# Patient Record
Sex: FEMALE | Race: WHITE | Employment: FULL TIME | URBAN - NONMETROPOLITAN AREA
[De-identification: names, ages, dates, MRNs, and addresses within clinical notes are randomized per-mention and may not be internally consistent; named-entity substitution may affect disease eponyms.]

---

## 2020-09-03 ENCOUNTER — TELEPHONE (OUTPATIENT)
Dept: OBGYN CLINIC | Age: 35
End: 2020-09-03

## 2020-09-03 RX ORDER — NORGESTIMATE AND ETHINYL ESTRADIOL 0.25-0.035
1 KIT ORAL DAILY
Qty: 1 PACKET | Refills: 0 | Status: SHIPPED | OUTPATIENT
Start: 2020-09-03 | End: 2020-10-15 | Stop reason: ALTCHOICE

## 2020-10-01 ENCOUNTER — TELEPHONE (OUTPATIENT)
Dept: OBGYN CLINIC | Age: 35
End: 2020-10-01

## 2020-10-01 RX ORDER — NORGESTIMATE AND ETHINYL ESTRADIOL 0.25-0.035
1 KIT ORAL DAILY
Qty: 3 PACKET | Refills: 0 | Status: SHIPPED | OUTPATIENT
Start: 2020-10-01 | End: 2021-05-10 | Stop reason: ALTCHOICE

## 2020-10-15 ENCOUNTER — OFFICE VISIT (OUTPATIENT)
Dept: OBGYN CLINIC | Age: 35
End: 2020-10-15
Payer: COMMERCIAL

## 2020-10-15 VITALS
HEIGHT: 66 IN | WEIGHT: 230.8 LBS | BODY MASS INDEX: 37.09 KG/M2 | SYSTOLIC BLOOD PRESSURE: 120 MMHG | DIASTOLIC BLOOD PRESSURE: 80 MMHG

## 2020-10-15 PROCEDURE — 99395 PREV VISIT EST AGE 18-39: CPT | Performed by: NURSE PRACTITIONER

## 2020-10-15 RX ORDER — LEVOTHYROXINE SODIUM 88 MCG
TABLET ORAL
COMMUNITY
Start: 2020-08-17 | End: 2021-05-10 | Stop reason: ALTCHOICE

## 2020-10-15 SDOH — HEALTH STABILITY: MENTAL HEALTH: HOW OFTEN DO YOU HAVE A DRINK CONTAINING ALCOHOL?: MONTHLY OR LESS

## 2020-10-15 NOTE — PROGRESS NOTES
YEARLY PHYSICAL    Date of service: 10/15/2020    Nader Lucero  Is a 30 y.o.  female    PT's PCP is: FREDRICK Morelos CNP     : 1985                                             Subjective:       Patient's last menstrual period was 10/07/2020 (exact date). Are your menses regular: yes     OB History    Para Term  AB Living   0 0 0 0 0 0   SAB TAB Ectopic Molar Multiple Live Births   0 0 0 0 0 0        Social History     Tobacco Use   Smoking Status Never Smoker   Smokeless Tobacco Never Used        Social History     Substance and Sexual Activity   Alcohol Use Yes    Frequency: Monthly or less       Family History   Problem Relation Age of Onset    Alzheimer's Disease Paternal Grandfather     Breast Cancer Paternal Grandmother     Hypertension Mother        Any family history of breast or ovarian cancer:  Yes    Any family history of blood clots: No      Allergies: No known allergies      Current Outpatient Medications:     SYNTHROID 88 MCG tablet, , Disp: , Rfl:     norgestimate-ethinyl estradiol (ORTHO-CYCLEN, 28,) 0.25-35 MG-MCG per tablet, Take 1 tablet by mouth daily, Disp: 3 packet, Rfl: 0    Triamcinolone Acetonide (NASACORT AQ NA), by Nasal route, Disp: , Rfl:     OMEPRAZOLE PO, Take by mouth, Disp: , Rfl:     Levothyroxine Sodium (SYNTHROID PO), Take by mouth, Disp: , Rfl:     Social History     Substance and Sexual Activity   Sexual Activity Yes    Partners: Male    Birth control/protection: OCP       Any bleeding or pain with intercourse: No    Last Yearly:  2019    Last pap: 2019    Last HPV: 2019    Last Mammogram: n/a    Last Dexascan n/a    Do you do self breast exams: Yes    Past Medical History:   Diagnosis Date    Asthma     Gastric reflux     Hypothyroid        Past Surgical History:   Procedure Laterality Date    BACK SURGERY         Family History   Problem Relation Age of Onset    Alzheimer's Disease Paternal Grandfather     Breast Cancer Paternal Grandmother     Hypertension Mother        Chief Complaint   Patient presents with    Gynecologic Exam     Pap not due.  Breast Problem     Lump on side of right breast.           PE:  Vital Signs  Blood pressure 120/80, height 5' 6\" (1.676 m), weight 230 lb 12.8 oz (104.7 kg), last menstrual period 10/07/2020. Estimated body mass index is 37.25 kg/m² as calculated from the following:    Height as of this encounter: 5' 6\" (1.676 m). Weight as of this encounter: 230 lb 12.8 oz (104.7 kg). HPI: Patient presents today for annual exam. Denies pelvic pain. Normal pap in 2019 per [facility] records. Reports breast lump on outer lower quadrant of right breast, noticed approximately 5 months ago. Denies nipple discharge or skin changes. Denies pain in the breast. Getting  in 1 month    Review of Systems   All other systems reviewed and are negative. Objective  Lymphatic:   no lymphadenopathy  Heent:   negative   Cor: regular rate and rhythm, no murmurs              Pul:clear to auscultation bilaterally- no wheezes, rales or rhonchi, normal air movement, no respiratory distress      GI: Abdomen soft, non-tender. BS normal. No masses,  No organomegaly           Extremities: normal strength, tone, and muscle mass, ROM of all joints is normal   Breasts: Breast:normal appearance, no masses or tenderness, No nipple retraction or dimpling, No nipple discharge or bleeding. Fibrocystic, dense tissue bilaterally     Pelvic Exam: GENITAL/URINARY:  External Genitalia:  General appearance; normal, Hair distribution; normal, Lesions absent  Urethral Meatus:  Size normal, Location normal, Lesions absent, Prolapse absent  Urethra:   Fullness absent, Masses absent  Bladder:  Fullness absent, Masses absent, Tenderness absent, Cystocele absent  Vagina:  General appearance normal, Estrogen effect normal, Discharge absent, Lesions absent,

## 2021-04-19 ENCOUNTER — HOSPITAL ENCOUNTER (OUTPATIENT)
Age: 36
Setting detail: SPECIMEN
Discharge: HOME | End: 2021-04-19
Payer: COMMERCIAL

## 2021-04-19 ENCOUNTER — TELEPHONE (OUTPATIENT)
Dept: OBGYN CLINIC | Age: 36
End: 2021-04-19

## 2021-04-19 DIAGNOSIS — N92.0 SPOTTING: Primary | ICD-10-CM

## 2021-04-19 DIAGNOSIS — N92.0 SPOTTING: ICD-10-CM

## 2021-04-19 LAB — HCG QUANTITATIVE: 891 IU/L

## 2021-04-20 DIAGNOSIS — N92.0 SPOTTING: Primary | ICD-10-CM

## 2021-04-20 NOTE — RESULT ENCOUNTER NOTE
Patient called back, we reviewed her results and recommendations. She will stop in tomorrow am to get a repeat HCG level. Patient denies any further spotting since her last visit. Patient verbalized understanding, no further questions/concerns voiced.

## 2021-04-21 ENCOUNTER — HOSPITAL ENCOUNTER (OUTPATIENT)
Age: 36
Setting detail: SPECIMEN
Discharge: HOME | End: 2021-04-21
Payer: COMMERCIAL

## 2021-04-21 DIAGNOSIS — N92.0 SPOTTING: ICD-10-CM

## 2021-04-21 LAB — HCG QUANTITATIVE: 1720 IU/L

## 2021-05-10 ENCOUNTER — OFFICE VISIT (OUTPATIENT)
Dept: OBGYN CLINIC | Age: 36
End: 2021-05-10
Payer: COMMERCIAL

## 2021-05-10 VITALS — BODY MASS INDEX: 37.99 KG/M2 | DIASTOLIC BLOOD PRESSURE: 74 MMHG | WEIGHT: 235.4 LBS | SYSTOLIC BLOOD PRESSURE: 114 MMHG

## 2021-05-10 DIAGNOSIS — N91.1 AMENORRHEA, SECONDARY: Primary | ICD-10-CM

## 2021-05-10 PROCEDURE — 99213 OFFICE O/P EST LOW 20 MIN: CPT | Performed by: NURSE PRACTITIONER

## 2021-05-10 RX ORDER — LEVOTHYROXINE SODIUM 100 MCG
TABLET ORAL
COMMUNITY
Start: 2021-04-12

## 2021-05-10 ASSESSMENT — ENCOUNTER SYMPTOMS
GASTROINTESTINAL NEGATIVE: 1
RESPIRATORY NEGATIVE: 1

## 2021-05-10 NOTE — PROGRESS NOTES
PROBLEM VISIT     Date of service: 5/10/2021    Ryan Setting  Is a 29 y.o.  female    PT's PCP is: FREDRICK Key - [facility]     : 1985                                             Subjective:       Patient's last menstrual period was 03/15/2021 (exact date). OB History    Para Term  AB Living   1 0 0 0 0 0   SAB TAB Ectopic Molar Multiple Live Births   0 0 0 0 0 0      # Outcome Date GA Lbr Amado/2nd Weight Sex Delivery Anes PTL Lv   1 Current                 Social History     Tobacco Use   Smoking Status Never Smoker   Smokeless Tobacco Never Used        Social History     Substance and Sexual Activity   Alcohol Use Not Currently    Frequency: Monthly or less       Allergies: No known allergies      Current Outpatient Medications:     SYNTHROID 100 MCG tablet, , Disp: , Rfl:     Prenatal Vit-Fe Fumarate-FA (PRENATAL VITAMIN PO), Take by mouth, Disp: , Rfl:     Social History     Substance and Sexual Activity   Sexual Activity Yes    Partners: Male    Birth control/protection: OCP     Chief Complaint   Patient presents with    Follow-up     Follow up viability usn. Feeling well, voices no concerns. PE:  Vital Signs  Blood pressure 114/74, weight 235 lb 6.4 oz (106.8 kg), last menstrual period 03/15/2021. HPI: Patient presents today following viability. Denies vaginal bleeding and abdominal pain. Feeling well aside from mild fatigue. PT denies fever, chills, nausea and vomiting       Review of Systems   Constitutional: Negative. Respiratory: Negative. Cardiovascular: Negative. Gastrointestinal: Negative. Genitourinary: Positive for menstrual problem (amenorreha pregnant ). Negative for pelvic pain, vaginal bleeding and vaginal discharge. Neurological: Negative. Physical Exam  Constitutional:       Appearance: Normal appearance. HENT:      Head: Normocephalic.    Pulmonary:      Effort: Pulmonary effort is normal.   Musculoskeletal: Normal range of motion. Neurological:      General: No focal deficit present. Mental Status: She is alert. Psychiatric:         Mood and Affect: Mood normal.         Behavior: Behavior normal.       Assessment and Plan          Diagnosis Orders   1. Amenorrhea, secondary         Viable IUP, size c/w dates- ANA 12/20/21, cervical length 4.7 cm, fht's 155, left para-ovarian cyst 1.7 x 1.2 x 1.6 cm     Encouraged to continue taking PNV and schedule appt's accordingly. I have discontinued Paola Tavares's Triamcinolone Acetonide (NASACORT AQ NA), OMEPRAZOLE PO, Levothyroxine Sodium (SYNTHROID PO), and norgestimate-ethinyl estradiol. I am also having her maintain her Synthroid and Prenatal Vit-Fe Fumarate-FA (PRENATAL VITAMIN PO). Return in about 3 weeks (around 5/31/2021) for PNI . There are no Patient Instructions on file for this visit.     Paulie Husbands Troykaitlynn,5/10/2021 2:12 PM

## 2021-05-20 ENCOUNTER — TELEPHONE (OUTPATIENT)
Dept: OBGYN CLINIC | Age: 36
End: 2021-05-20